# Patient Record
Sex: MALE | Race: WHITE | NOT HISPANIC OR LATINO | Employment: FULL TIME | URBAN - METROPOLITAN AREA
[De-identification: names, ages, dates, MRNs, and addresses within clinical notes are randomized per-mention and may not be internally consistent; named-entity substitution may affect disease eponyms.]

---

## 2021-06-01 ENCOUNTER — HOSPITAL ENCOUNTER (EMERGENCY)
Facility: MEDICAL CENTER | Age: 61
End: 2021-06-01
Attending: EMERGENCY MEDICINE
Payer: COMMERCIAL

## 2021-06-01 VITALS
HEIGHT: 72 IN | BODY MASS INDEX: 28.61 KG/M2 | RESPIRATION RATE: 19 BRPM | WEIGHT: 211.2 LBS | OXYGEN SATURATION: 95 % | TEMPERATURE: 98 F | DIASTOLIC BLOOD PRESSURE: 87 MMHG | HEART RATE: 79 BPM | SYSTOLIC BLOOD PRESSURE: 149 MMHG

## 2021-06-01 DIAGNOSIS — S61.512A LACERATION OF LEFT WRIST, INITIAL ENCOUNTER: ICD-10-CM

## 2021-06-01 PROCEDURE — 304217 HCHG IRRIGATION SYSTEM

## 2021-06-01 PROCEDURE — 303747 HCHG EXTRA SUTURE

## 2021-06-01 PROCEDURE — 90471 IMMUNIZATION ADMIN: CPT

## 2021-06-01 PROCEDURE — 304999 HCHG REPAIR-SIMPLE/INTERMED LEVEL 1

## 2021-06-01 PROCEDURE — 700101 HCHG RX REV CODE 250: Performed by: EMERGENCY MEDICINE

## 2021-06-01 PROCEDURE — 700111 HCHG RX REV CODE 636 W/ 250 OVERRIDE (IP): Performed by: EMERGENCY MEDICINE

## 2021-06-01 PROCEDURE — 90715 TDAP VACCINE 7 YRS/> IM: CPT | Performed by: EMERGENCY MEDICINE

## 2021-06-01 PROCEDURE — 99283 EMERGENCY DEPT VISIT LOW MDM: CPT

## 2021-06-01 RX ORDER — LIDOCAINE HYDROCHLORIDE 20 MG/ML
20 INJECTION, SOLUTION INFILTRATION; PERINEURAL ONCE
Status: COMPLETED | OUTPATIENT
Start: 2021-06-01 | End: 2021-06-01

## 2021-06-01 RX ORDER — CEPHALEXIN 500 MG/1
500 CAPSULE ORAL 4 TIMES DAILY
Qty: 40 CAPSULE | Refills: 0 | Status: SHIPPED | OUTPATIENT
Start: 2021-06-01 | End: 2021-06-15

## 2021-06-01 RX ADMIN — LIDOCAINE HYDROCHLORIDE 20 ML: 20 INJECTION, SOLUTION INFILTRATION; PERINEURAL at 11:12

## 2021-06-01 RX ADMIN — CLOSTRIDIUM TETANI TOXOID ANTIGEN (FORMALDEHYDE INACTIVATED), CORYNEBACTERIUM DIPHTHERIAE TOXOID ANTIGEN (FORMALDEHYDE INACTIVATED), BORDETELLA PERTUSSIS TOXOID ANTIGEN (GLUTARALDEHYDE INACTIVATED), BORDETELLA PERTUSSIS FILAMENTOUS HEMAGGLUTININ ANTIGEN (FORMALDEHYDE INACTIVATED), BORDETELLA PERTUSSIS PERTACTIN ANTIGEN, AND BORDETELLA PERTUSSIS FIMBRIAE 2/3 ANTIGEN 0.5 ML: 5; 2; 2.5; 5; 3; 5 INJECTION, SUSPENSION INTRAMUSCULAR at 11:30

## 2021-06-01 NOTE — ED NOTES
Per patient, laceration to L. Wrist. Bleeding controlled at this time. Patient states fell from attic. Unknown status of TDap shot

## 2021-06-01 NOTE — ED NOTES
Discussed DC with patient, patient verbalized understanding regarding follow up with stitches removal and medications. Patient A&Ox4 prior to DC.

## 2021-06-01 NOTE — ED TRIAGE NOTES
nam  .  Chief Complaint   Patient presents with   • Wrist Laceration     patient fell in attic space at work cutting his left wrist on a duct.      Patient to triage with above complaint. Bleeding controlled with dressing.   Mask in place.   Patient to lobby and instructed to inform staff of any needs.

## 2021-06-01 NOTE — ED PROVIDER NOTES
ED Provider Note    CHIEF COMPLAINT  Chief Complaint   Patient presents with   • Wrist Laceration     patient fell in attic space at work cutting his left wrist on a duct.        HPI  Clark Jernigan is a 61 y.o. male here for evaluation of left wrist laceration. Patient was at work at the school [employer], when he fell through a portion of the attic space, and lacerated the left wrist on a heating duct. Patient states he is unknown as to when his last tetanus shot was, but states it was greater than 10 years ago. He only has a left wrist injury. Patient is right-handed. He went and saw the school nurse, and they wrapped his wrist and sent him here. He was earlier today. He has no other medical complaints. No fever chills, no vomiting, no chest pain or shortness of breath.    ROS;  Please see HPI  O/W negative     PAST MEDICAL HISTORY   No bleeding disorders    SOCIAL HISTORY  Social History     Tobacco Use   • Smoking status: Never Smoker   • Smokeless tobacco: Never Used   Substance and Sexual Activity   • Alcohol use: Yes     Alcohol/week: 1.2 oz     Types: 2 Standard drinks or equivalent per week   • Drug use: Never   • Sexual activity: Not on file       SURGICAL HISTORY  patient denies any surgical history    CURRENT MEDICATIONS  Home Medications     Reviewed by Elsa Vergara R.N. (Registered Nurse) on 06/01/21 at 0941  Med List Status: Complete   Medication Last Dose Status   clindamycin (CLEOCIN) 300 MG CAPS not taking Active   hydrocodone-acetaminophen (NORCO) 7.5-325 MG per tablet not taking Active                ALLERGIES  No Known Allergies    REVIEW OF SYSTEMS  See HPI for further details. Review of systems as above, otherwise all other systems are negative.     PHYSICAL EXAM  VITAL SIGNS: /100   Pulse 89   Temp 36.7 °C (98 °F) (Temporal)   Resp 15   Ht 1.829 m (6')   Wt 95.8 kg (211 lb 3.2 oz)   SpO2 96%   BMI 28.64 kg/m²     Constitutional: Well developed, well nourished. No acute  distress.  HEENT: Normocephalic, atraumatic. MMM  Neck: Supple, Full range of motion   Chest/Pulmonary:  No respiratory distress.  Equal expansion   Musculoskeletal: No deformity, no edema, neurovascular intact. Left upper extremity; distal wrist, volar aspect with 4 cm linear laceration. No active bleeding. Neurovascular tact distally to the hand, tendon function intact.  Neuro: Clear speech, appropriate, cooperative, cranial nerves II-XII grossly intact.  Good flex/ext of all fingers of left hand.   Psych: Normal mood and affect      PROCEDURES   Laceration Repair Procedure    Indication: Laceration    Location/Description: left volar, wrist. 4 cm    Procedure: The patient was placed in the appropriate position and anesthesia around the laceration was obtained by infiltration using 1% Lidocaine without epinephrine. The area was then irrigated with normal saline. The laceration was closed with 5-0 Prolene using interrupted sutures. There were no additional lacerations requiring repair. The wound area was then dressed with bacitracin and a sterile dressing.      Total repaired wound length: 4 cm.     Other Items: Suture count: 8    The patient tolerated the procedure well.    Complications: None      MEDICAL RECORD  I have reviewed patient's medical record and pertinent results are listed.    COURSE & MEDICAL DECISION MAKING  I have reviewed any medical record information, laboratory studies and radiographic results as noted above.    The pt had his wrist laceration repaired, and will be given a splint and antibiotics.  He will follow up  With workmans comp.    I you have had any blood pressure issues while here in the emergency department, please see your doctor for a further evaluation or work up.    Differential diagnoses include but not limited to: wrist laceration/tendon involvement     This patient presents with wrist laceration .  At this time, I have counseled the patient/family regarding their medications,  pain control, and follow up.  They will continue their medications, if any, as prescribed.  They will return immediately for any worsening symptoms and/or any other medical concerns.  They will see their doctor, or contact the doctor provided, in 1-2 days for follow up.       FINAL IMPRESSION  Wrist laceration   Tetanus update     Electronically signed by: Christopher Massey D.O., 6/1/2021 10:30 AM

## 2023-06-04 ENCOUNTER — OFFICE VISIT (OUTPATIENT)
Dept: URGENT CARE | Facility: CLINIC | Age: 63
End: 2023-06-04
Payer: COMMERCIAL

## 2023-06-04 VITALS
HEART RATE: 70 BPM | RESPIRATION RATE: 16 BRPM | WEIGHT: 200 LBS | BODY MASS INDEX: 27.09 KG/M2 | HEIGHT: 72 IN | OXYGEN SATURATION: 97 % | SYSTOLIC BLOOD PRESSURE: 128 MMHG | TEMPERATURE: 98.7 F | DIASTOLIC BLOOD PRESSURE: 82 MMHG
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
## 2023-06-04 DIAGNOSIS — S20.469A TICK BITE OF BACK WALL OF THORAX, UNSPECIFIED LOCATION, INITIAL ENCOUNTER: ICD-10-CM

## 2023-06-04 PROCEDURE — 99203 OFFICE O/P NEW LOW 30 MIN: CPT | Performed by: PHYSICIAN ASSISTANT

## 2023-06-04 PROCEDURE — 3074F SYST BP LT 130 MM HG: CPT | Performed by: PHYSICIAN ASSISTANT

## 2023-06-04 PROCEDURE — 3079F DIAST BP 80-89 MM HG: CPT | Performed by: PHYSICIAN ASSISTANT

## 2023-06-04 RX ORDER — DOXYCYCLINE HYCLATE 100 MG
100 TABLET ORAL 2 TIMES DAILY
Qty: 20 TABLET | Refills: 0 | Status: SHIPPED | OUTPATIENT
Start: 2023-06-04 | End: 2023-06-14

## 2023-06-04 RX ORDER — SODIUM FLUORIDE 6 MG/ML
PASTE, DENTIFRICE DENTAL
COMMUNITY
Start: 2023-05-23 | End: 2023-06-04

## 2023-06-04 RX ORDER — DOXYCYCLINE HYCLATE 100 MG
100 TABLET ORAL 2 TIMES DAILY
Qty: 20 TABLET | Refills: 0 | Status: SHIPPED | OUTPATIENT
Start: 2023-06-04 | End: 2023-06-04 | Stop reason: SDUPTHER

## 2023-06-04 ASSESSMENT — ENCOUNTER SYMPTOMS
HEADACHES: 0
VOMITING: 0
FEVER: 0
NAUSEA: 0
MYALGIAS: 0
CHILLS: 0

## 2023-06-04 NOTE — PROGRESS NOTES
"Subjective     Clark Moses Jernigan is a 63 y.o. male who presents with Bug Bite (X1day, wife pulled a tick off of back, was camping, has a red ring around it, itches a little bit, )            Patient reports he was camping and out in the wilderness this past week. Wife saw a tick on his back last night and pulled it off. She noticed redness around the bite today and was concerned that it was the \"bullseye rash\". He has been putting alcohol on it. TDAP is UTD.         History reviewed. No pertinent past medical history.      History reviewed. No pertinent surgical history.      No family history on file.      Patient has no known allergies.      Medications, Allergies, and current problem list reviewed today in Epic      Review of Systems   Constitutional:  Negative for chills, fever and malaise/fatigue.   Gastrointestinal:  Negative for nausea and vomiting.   Musculoskeletal:  Negative for myalgias.   Skin:         Tick bite on back with surrounding redness   Neurological:  Negative for headaches.     All other systems reviewed and are negative.            Objective     /82   Pulse 70   Temp 37.1 °C (98.7 °F) (Temporal)   Resp 16   Ht 1.829 m (6')   Wt 90.7 kg (200 lb)   SpO2 97%   BMI 27.12 kg/m²      Physical Exam  Constitutional:       General: He is not in acute distress.     Appearance: He is not ill-appearing.   HENT:      Head: Normocephalic and atraumatic.   Eyes:      Conjunctiva/sclera: Conjunctivae normal.   Cardiovascular:      Rate and Rhythm: Normal rate and regular rhythm.   Pulmonary:      Effort: Pulmonary effort is normal. No respiratory distress.      Breath sounds: No stridor. No wheezing.   Skin:     General: Skin is warm and dry.          Neurological:      General: No focal deficit present.      Mental Status: He is alert and oriented to person, place, and time.   Psychiatric:         Mood and Affect: Mood normal.         Behavior: Behavior normal.         Thought Content: " Thought content normal.         Judgment: Judgment normal.                             Assessment & Plan        1. Tick bite of back wall of thorax, unspecified location, initial encounter  I feel the outer erythema is likely due to reaction to band-aid   And not true erythema migrans.     - doxycycline (VIBRAMYCIN) 100 MG Tab; Take 1 Tablet by mouth 2 times a day for 10 days.  Dispense: 20 Tablet; Refill: 0       Differential diagnoses, Supportive care, and indications for immediate follow-up discussed with patient.   Pathogenesis of diagnosis discussed including typical length and natural progression.   Instructed to return to clinic or nearest emergency department for any change in condition, further concerns, or worsening of symptoms.        The patient demonstrated a good understanding and agreed with the treatment plan.      Georgiana Barkley P.A.-C.